# Patient Record
Sex: FEMALE | Race: WHITE | NOT HISPANIC OR LATINO | Employment: STUDENT | URBAN - METROPOLITAN AREA
[De-identification: names, ages, dates, MRNs, and addresses within clinical notes are randomized per-mention and may not be internally consistent; named-entity substitution may affect disease eponyms.]

---
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
## 2022-01-09 ENCOUNTER — HEALTH MAINTENANCE LETTER (OUTPATIENT)
Age: 22
End: 2022-01-09

## 2022-06-21 ENCOUNTER — OFFICE VISIT (OUTPATIENT)
Dept: OBGYN | Facility: CLINIC | Age: 22
End: 2022-06-21
Payer: COMMERCIAL

## 2022-06-21 VITALS
SYSTOLIC BLOOD PRESSURE: 107 MMHG | WEIGHT: 161.6 LBS | DIASTOLIC BLOOD PRESSURE: 74 MMHG | BODY MASS INDEX: 24.57 KG/M2 | HEART RATE: 55 BPM

## 2022-06-21 DIAGNOSIS — Z30.09 ENCOUNTER FOR OTHER GENERAL COUNSELING AND ADVICE ON CONTRACEPTION: Primary | ICD-10-CM

## 2022-06-21 DIAGNOSIS — Z30.430 ENCOUNTER FOR INSERTION OF INTRAUTERINE CONTRACEPTIVE DEVICE: ICD-10-CM

## 2022-06-21 DIAGNOSIS — Z11.3 SCREEN FOR STD (SEXUALLY TRANSMITTED DISEASE): ICD-10-CM

## 2022-06-21 DIAGNOSIS — Z32.00 PREGNANCY EXAMINATION OR TEST, PREGNANCY UNCONFIRMED: ICD-10-CM

## 2022-06-21 LAB — HCG UR QL: NEGATIVE

## 2022-06-21 PROCEDURE — 87591 N.GONORRHOEAE DNA AMP PROB: CPT | Performed by: OBSTETRICS & GYNECOLOGY

## 2022-06-21 PROCEDURE — 87491 CHLMYD TRACH DNA AMP PROBE: CPT | Performed by: OBSTETRICS & GYNECOLOGY

## 2022-06-21 PROCEDURE — 81025 URINE PREGNANCY TEST: CPT | Performed by: OBSTETRICS & GYNECOLOGY

## 2022-06-21 PROCEDURE — 99213 OFFICE O/P EST LOW 20 MIN: CPT | Mod: 25 | Performed by: OBSTETRICS & GYNECOLOGY

## 2022-06-21 PROCEDURE — 58300 INSERT INTRAUTERINE DEVICE: CPT | Performed by: OBSTETRICS & GYNECOLOGY

## 2022-06-21 NOTE — PROGRESS NOTES
SUBJECTIVE:       HPI: Sharon Landa is a 22 year old  who presents today for counseling and initiation of contraception.     Sharon is interested in an IUD most likely.    Previously use methods:  - OCPs. Unwanted BTB and mood concerns. Still taking. Taking pills daily, not taking late.    Ob Hx:        Gyn Hx: Patient's last menstrual period was 2022.    Patient is sexually active. One male partner   Last pap was NA<22yo   STI history - denies  STD testing offered?  Accepted  Menarche 14-15 years old. Menses every 28 days. heavy flow.  Family history of gyn-related malignancies: denies         reports that she has never smoked. She has never used smokeless tobacco.      Today's PHQ-2 Score: No flowsheet data found.  Today's PHQ-9 Score: No flowsheet data found.  Today's BLAS-7 Score: No flowsheet data found.    Problem list and histories reviewed & adjusted, as indicated.  Additional history: as documented.    Patient Active Problem List   Diagnosis     Hip pain     Past Surgical History:   Procedure Laterality Date     NO HISTORY OF SURGERY        Social History     Tobacco Use     Smoking status: Never Smoker     Smokeless tobacco: Never Used   Substance Use Topics     Alcohol use: No      *Family history is unknown by patient.            desogestrel-ethinyl estradiol (APRI) 0.15-30 MG-MCG tablet, Take 1 tablet by mouth daily  levonorgestrel (MIRENA) 20 MCG/DAY IUD, 1 each (20 mcg) by Intrauterine route once    No current facility-administered medications on file prior to visit.    No Known Allergies    ROS:  10 Point review of systems negative other noted above in HPI    OBJECTIVE:     /74   Pulse 55   Wt 73.3 kg (161 lb 9.6 oz)   LMP 2022   BMI 24.57 kg/m    Body mass index is 24.57 kg/m .      Gen: Alert, oriented, appropriately interactive, NAD  Chest: Symmetrical, unlabored breathing  Abdomen: soft, non tender, non distended, no masses, no hernias. No inguinal  lymphadenopathy.   External genitalia: no lesions; normal appearing external genitalia, bartholins glands, urethra, skenes glands  Vagina: no masses or lesions or discharge, normally rugated.  Cervix: no masses or lesions or discharge   MSK: normal gait, symmetric movements UE & LE  Lower extremities: non-tender, no edema      In-Clinic Test Results:  Results for orders placed or performed in visit on 22 (from the past 24 hour(s))   HCG Qual, Urine ([id number])   Result Value Ref Range    hCG Urine Qualitative Negative Negative       ASSESSMENT/PLAN:                                                      Sharon Landa is a 22 year old  who presents today counseling and initiation of contraception      ICD-10-CM    1. Encounter for other general counseling and advice on contraception  Z30.09    2. Pregnancy examination or test, pregnancy unconfirmed  Z32.00 HCG Qual, Urine ([id number])   3. Encounter for insertion of intrauterine contraceptive device  Z30.430 levonorgestrel (MIRENA) 20 MCG/DAY IUD     levonorgestrel (MIRENA) 20 MCG/DAY IUD 20 mcg     INSERTION INTRAUTERINE DEVICE   4. Screen for STD (sexually transmitted disease)  Z11.3 NEISSERIA GONORRHOEA PCR     CHLAMYDIA TRACHOMATIS PCR       We discussed all forms of birth control available, including LARC therapy (IUD, Nexplanon), Depo Provera, OCPs, NuvaRing or the patch, condoms and permanent sterilization in form of tubal ligation or vasectomy. After discussing risks and benefits of options, she decided that she would like Mirena IUD    Mirena IUD placed today without complication. Please see procedure note below for more information.      Azalea Bazan [facility]      IUD Insertion:  CONSULT:    Is a pregnancy test required: Yes.  Was it positive or negative?  Negative  Was a consent obtained?  Yes    Subjective: Sharon Landa is a 22 year old  presents for IUD and desires Mirena type IUD.    Patient has  been given the opportunity to ask questions about all forms of birth control, including all options appropriate for Sharon Landa. Discussed that no method of birth control, except abstinence is 100% effective against pregnancy or sexually transmitted infection.     Sharon Landa understands she may have the IUD removed at any time. IUD should be removed by a health care provider.    The entire insertion procedure was reviewed with the patient, including care after placement.    Patient's last menstrual period was 06/17/2022. Has current contraception. No allergy to betadine or shellfish. Patient desires STD screening  hCG Urine Qualitative   Date Value Ref Range Status   06/21/2022 Negative Negative Final     Comment:     This test is for screening purposes.  Results should be interpreted along with the clinical picture.  Confirmation testing is available if warranted by ordering [id number], HCG Quantitative Pregnancy.         /74   Pulse 55   Wt 73.3 kg (161 lb 9.6 oz)   LMP 06/17/2022   BMI 24.57 kg/m      Pelvic Exam:   EG/BUS: normal genital architecture without lesions, erythema or abnormal secretions.   Vagina: moist, pink, rugae with physiologic discharge and secretions  Cervix: Nulliparous no lesions and pink, moist, closed, without lesion or CMT  Uterus: mid-position, mobile, no pain  Adnexa: within normal limits and no masses, nodularity, tenderness    PROCEDURE NOTE: -- IUD Insertion    Reason for Insertion: contraception and abnormal uterine bleeding      Under sterile technique, cervix was visualized with speculum and prepped with Betadine solution swab x 3. Tenaculum was placed for stability. The uterus was gently straightened and sounded to 7.0 cm. IUD prepared for placement, and IUD inserted according to 's instructions without difficulty or significant resitance, and deployed at the fundus. The strings were visualized and trimmed to 2.5 cm from the external os. Tenaculum  was removed and hemostasis noted. Speculum removed.  Patient tolerated procedure well.    Lot # [id number]  Exp: 3/24/22    EBL: minimal    Complications: none    ASSESSMENT:     ICD-10-CM    1. Encounter for other general counseling and advice on contraception  Z30.09    2. Pregnancy examination or test, pregnancy unconfirmed  Z32.00 HCG Qual, Urine ([id number])   3. Encounter for insertion of intrauterine contraceptive device  Z30.430 levonorgestrel (MIRENA) 20 MCG/DAY IUD     levonorgestrel (MIRENA) 20 MCG/DAY IUD 20 mcg     INSERTION INTRAUTERINE DEVICE   4. Screen for STD (sexually transmitted disease)  Z11.3 NEISSERIA GONORRHOEA PCR     CHLAMYDIA TRACHOMATIS PCR        PLAN:    Given 's handouts, including when to have IUD removed, list of danger s/sx, side effects and follow up recommended. Encouraged condom use for prevention of STD. Back up contraception advised for 7 days if progestin method. Advised to call for any fever, for prolonged or severe pain or bleeding, abnormal vaginal discharge, or unable to palpate strings. She was advised to use pain medications (ibuprofen) as needed for mild to moderate pain. Advised to follow-up in clinic in 4-6 weeks for IUD string check if unable to find strings or as directed by provider.     Azalea Bazan, DO

## 2022-06-21 NOTE — PATIENT INSTRUCTIONS
If you have any questions regarding your visit, Please contact your care team.    [facility]: [phone]      Women s Health CLINIC HOURS TELEPHONE NUMBER   Azalea Bazan DO.    JESSICA Retana -Surgery Scheduler  Kristine - Surgery Scheduler    FANNY Mckee, RN  FANNY Mahoney     Monday, Thursday  [place]  7am-3pm    Tuesday, Wednesday  [place]  7am-3pm    E/O Friday &   [place]    Typical Surgery Days: Thursday or Friday   [facility]  [address] [place] [address] [phone] Phone  [phone] Fax    [address] [address] [phone] Phone    Imaging [phone] Phone    [facility] Labor and Delivery:  [phone] Phone     **Surgeries** Our Surgery Schedulers will contact you to schedule. If you do not receive a call within 3 business days, please call [phone].    Urgent Care locations:  [facility] Saturday and    9 am - 5 pm    Monday-Friday   12 pm - 8 pm  Saturday and    9 am - 5 pm   [phone]       If you need a medication refill, please contact your pharmacy. Please allow 3 business days for your refill to be completed.  As always, Thank you for trusting us with your healthcare needs!

## 2022-06-22 LAB
C TRACH DNA SPEC QL NAA+PROBE: NEGATIVE
N GONORRHOEA DNA SPEC QL NAA+PROBE: NEGATIVE

## 2022-07-29 ENCOUNTER — NURSE TRIAGE (OUTPATIENT)
Dept: NURSING | Facility: CLINIC | Age: 22
End: 2022-07-29

## 2022-07-29 ENCOUNTER — VIRTUAL VISIT (OUTPATIENT)
Dept: FAMILY MEDICINE | Facility: CLINIC | Age: 22
End: 2022-07-29
Payer: COMMERCIAL

## 2022-07-29 DIAGNOSIS — J01.00 ACUTE NON-RECURRENT MAXILLARY SINUSITIS: Primary | ICD-10-CM

## 2022-07-29 PROCEDURE — 99203 OFFICE O/P NEW LOW 30 MIN: CPT | Mod: 95 | Performed by: FAMILY MEDICINE

## 2022-07-29 ASSESSMENT — PAIN SCALES - GENERAL: PAINLEVEL: NO PAIN (0)

## 2022-07-29 NOTE — TELEPHONE ENCOUNTER
Patient is not with caller  Triager advised mother to call back when patient is with her on on the phone

## 2022-07-29 NOTE — PROGRESS NOTES
Sharon is a 22 year old who is being evaluated via a billable video visit.      How would you like to obtain your AVS? MyChart  If the video visit is dropped, the invitation should be resent by: Send to e-mail at: [email or web address]  Will anyone else be joining your video visit? No          Assessment & Plan     Acute non-recurrent maxillary sinusitis  Ongoing for two weeks. Discussed conservative cares. Treat with Augmentin for 7 days. If not improving advised in person visit. All questions answered.   - amoxicillin-clavulanate (AUGMENTIN) 875-125 MG tablet  Dispense: 14 tablet; Refill: 0    Follow up if not improving.     The risks, benefits and treatment options of prescribed medications or other treatments have been discussed with the patient. The patient verbalized their understanding and should call or follow up if no improvement or if they develop further problems.      Brodie Ashley DO  [facility]    Loren Herrera is a 22 year old, presenting for the following health issues:  URI      HPI     Acute Illness  Acute illness concerns: productive cough, home test for COVID 30 min. Ago was negative   Onset/Duration: started 2 weeks ago  Symptoms:  Fever: No  Chills/Sweats: No  Headache (location?): YES  Sinus Pressure: YES  Conjunctivitis:  No  Ear Pain: no  Rhinorrhea: YES  Congestion: YES  Sore Throat: YES  Cough: YES  Wheeze: YES  Decreased Appetite: No  Nausea: No  Vomiting: No  Diarrhea: No  Dysuria/Freq.: No  Dysuria or Hematuria: No  Fatigue/Achiness: YES  Sick/Strep Exposure: No  Therapies tried and outcome: Advil      Reports nasal congestion and pain.   Negative COVID test  No allergies.     Review of Systems   Constitutional, HEENT, cardiovascular, pulmonary, gi and gu systems are negative, except as otherwise noted.      Objective           Vitals:  No vitals were obtained today due to virtual visit.    Physical Exam   GENERAL: Healthy, alert and no distress  EYES: Eyes  grossly normal to inspection.  No discharge or erythema, or obvious scleral/conjunctival abnormalities.  RESP: No audible wheeze, cough, or visible cyanosis.  No visible retractions or increased work of breathing.    SKIN: Visible skin clear. No significant rash, abnormal pigmentation or lesions.  NEURO: Cranial nerves grossly intact.  Mentation and speech appropriate for age.  PSYCH: Mentation appears normal, affect normal/bright, judgement and insight intact, normal speech and appearance well-groomed.        Video-Visit Details    Video Start Time: 3:45    Type of service:  Video Visit    Video End Time:3:54 PM    Originating Location (pt. Location): Home    Distant Location (provider location):  [facility]     Platform used for Video Visit: Carma    .  ..

## 2022-11-15 DIAGNOSIS — J32.4 CHRONIC PANSINUSITIS: Primary | ICD-10-CM

## 2022-11-29 ENCOUNTER — LAB (OUTPATIENT)
Dept: LAB | Facility: CLINIC | Age: 22
End: 2022-11-29
Payer: COMMERCIAL

## 2022-11-29 DIAGNOSIS — Z11.59 NEED FOR HEPATITIS C SCREENING TEST: ICD-10-CM

## 2022-11-29 DIAGNOSIS — J32.4 CHRONIC PANSINUSITIS: ICD-10-CM

## 2022-11-29 DIAGNOSIS — Z11.3 SCREEN FOR STD (SEXUALLY TRANSMITTED DISEASE): ICD-10-CM

## 2022-11-29 DIAGNOSIS — Z11.4 SCREENING FOR HIV (HUMAN IMMUNODEFICIENCY VIRUS): ICD-10-CM

## 2022-11-29 PROCEDURE — 82787 IGG 1 2 3 OR 4 EACH: CPT

## 2022-11-29 PROCEDURE — 36415 COLL VENOUS BLD VENIPUNCTURE: CPT

## 2022-11-29 PROCEDURE — 87389 HIV-1 AG W/HIV-1&-2 AB AG IA: CPT

## 2022-11-29 PROCEDURE — 86803 HEPATITIS C AB TEST: CPT

## 2022-11-29 PROCEDURE — 82784 ASSAY IGA/IGD/IGG/IGM EACH: CPT | Mod: 59

## 2022-11-29 PROCEDURE — 82784 ASSAY IGA/IGD/IGG/IGM EACH: CPT

## 2022-11-30 LAB
HCV AB SERPL QL IA: NONREACTIVE
HIV 1+2 AB+HIV1 P24 AG SERPL QL IA: NONREACTIVE

## 2022-12-02 LAB
IGA SERPL-MCNC: 69 MG/DL (ref 84–499)
IGG SERPL-MCNC: 785 MG/DL (ref 610–1616)
IGG SERPL-MCNC: 785 MG/DL (ref 610–1616)
IGG1 SER-MCNC: 450 MG/DL (ref 382–929)
IGG2 SER-MCNC: 161 MG/DL (ref 242–700)
IGG3 SER-MCNC: 26 MG/DL (ref 22–176)
IGG4 SER-MCNC: 24 MG/DL (ref 4–86)
IGM SERPL-MCNC: 67 MG/DL (ref 35–242)
SUBCLASSES, PERCENT: 84 %

## 2023-04-16 ENCOUNTER — HEALTH MAINTENANCE LETTER (OUTPATIENT)
Age: 23
End: 2023-04-16

## 2023-06-28 ENCOUNTER — TELEPHONE (OUTPATIENT)
Dept: FAMILY MEDICINE | Facility: CLINIC | Age: 23
End: 2023-06-28
Payer: COMMERCIAL

## 2023-06-28 NOTE — TELEPHONE ENCOUNTER
Patient Quality Outreach    Patient is due for the following:   Cervical Cancer Screening - PAP Needed    Next Steps:   Schedule a Adult Preventative    Type of outreach:    Sent letter.      Questions for provider review:    None           Mary Costello, [facility]

## 2023-06-28 NOTE — LETTER
June 28, 2023    To  Sharon [place]  [address] [address] [place] [address]    Your team at [facility] cares about your health. We have reviewed your chart and based on our findings; we are making the following recommendations to better manage your health.     You are in particular need of attention regarding the following:     Schedule a primary care office visit with your provider for a Pap Smear to screen for Cervical Cancer.    If you have already completed these items, please contact the clinic via phone or   kooabahart so your care team can review and update your records. Thank you for   choosing [facility] for your healthcare needs. For any questions,   concerns, or to schedule an appointment please contact our clinic.    Healthy Regards,      Your [facility] Care Team

## 2023-10-12 ENCOUNTER — OFFICE VISIT (OUTPATIENT)
Dept: OBGYN | Facility: CLINIC | Age: 23
End: 2023-10-12
Payer: COMMERCIAL

## 2023-10-12 VITALS
SYSTOLIC BLOOD PRESSURE: 116 MMHG | TEMPERATURE: 98.1 F | BODY MASS INDEX: 24.96 KG/M2 | DIASTOLIC BLOOD PRESSURE: 66 MMHG | HEIGHT: 69 IN | WEIGHT: 168.5 LBS | HEART RATE: 80 BPM

## 2023-10-12 DIAGNOSIS — N89.8 VAGINAL ITCHING: ICD-10-CM

## 2023-10-12 DIAGNOSIS — Z23 NEED FOR PROPHYLACTIC VACCINATION AND INOCULATION AGAINST INFLUENZA: ICD-10-CM

## 2023-10-12 DIAGNOSIS — N76.2 ACUTE VULVITIS: Primary | ICD-10-CM

## 2023-10-12 LAB
CLUE CELLS: ABNORMAL
TRICHOMONAS, WET PREP: ABNORMAL
WBC'S/HIGH POWER FIELD, WET PREP: ABNORMAL
YEAST, WET PREP: ABNORMAL

## 2023-10-12 PROCEDURE — 87210 SMEAR WET MOUNT SALINE/INK: CPT | Performed by: OBSTETRICS & GYNECOLOGY

## 2023-10-12 PROCEDURE — 99213 OFFICE O/P EST LOW 20 MIN: CPT | Mod: 25 | Performed by: OBSTETRICS & GYNECOLOGY

## 2023-10-12 PROCEDURE — 87591 N.GONORRHOEAE DNA AMP PROB: CPT | Performed by: OBSTETRICS & GYNECOLOGY

## 2023-10-12 PROCEDURE — 87491 CHLMYD TRACH DNA AMP PROBE: CPT | Performed by: OBSTETRICS & GYNECOLOGY

## 2023-10-12 PROCEDURE — 90686 IIV4 VACC NO PRSV 0.5 ML IM: CPT | Performed by: OBSTETRICS & GYNECOLOGY

## 2023-10-12 PROCEDURE — 90471 IMMUNIZATION ADMIN: CPT | Performed by: OBSTETRICS & GYNECOLOGY

## 2023-10-12 NOTE — PROGRESS NOTES
S; Sharon Landa is a 23 year old  who present with concerns for vaginal infection. For the past week or so she has noticed a lot of vulvar itching and irritation, burning with urination.  She denies increased discharge or odor.  She has a mirena IUD, likes it, but does have some unpredictable spotting.  She is in a relatively new relationship, has some worries about STD, interested in screening today.    Past Medical History:   Diagnosis Date    No pertinent past medical history      Past Surgical History:   Procedure Laterality Date    NO HISTORY OF SURGERY       OB History    Para Term  AB Living   0 0 0 0 0 0   SAB IAB Ectopic Multiple Live Births   0 0 0 0 0      No Known Allergies      Current Outpatient Medications:     levonorgestrel (MIRENA) 20 MCG/DAY IUD, 1 each (20 mcg) by Intrauterine route once, Disp: , Rfl:     Social History     Socioeconomic History    Marital status: Single     Spouse name: Not on file    Number of children: Not on file    Years of education: Not on file    Highest education level: Not on file   Occupational History    Not on file   Tobacco Use    Smoking status: Never    Smokeless tobacco: Never   Vaping Use    Vaping Use: Former    Substances: Nicotine, Flavoring    Devices: Refillable tank   Substance and Sexual Activity    Alcohol use: Yes     Comment: occ    Drug use: No    Sexual activity: Yes     Partners: Male     Birth control/protection: I.U.D.   Other Topics Concern    Not on file   Social History Narrative    Not on file     Social Determinants of Health     Financial Resource Strain: Not on file   Food Insecurity: Not on file   Transportation Needs: Not on file   Physical Activity: Not on file   Stress: Not on file   Social Connections: Not on file   Interpersonal Safety: Not on file   Housing Stability: Not on file     Family History   Problem Relation Age of Onset    Skin Cancer Father     Skin Cancer Maternal Grandfather      Past medical,  "surgical, social and family history were reviewed and updated in EPIC.    PE: /66   Pulse 80   Temp 98.1  F (36.7  C)   Ht 1.753 m (5' 9\")   Wt 76.4 kg (168 lb 8 oz)   BMI 24.88 kg/m       Psych: normal affect, appropriate eye contact  Resp: no increased work of breathing  CV: no peripheral edema  Abd; SNT, no palpable masses  Lymph: no enlarged inquinal nodes  Pelvic: shaved vulva with erythema, no lesions.  Vagina with mild erythema, scant bloody discharge. Cervix without lesions or CMT  Skin: no visible rashes or lesions.    Wet prep: negative    A/P: Vulvar inflammation   Discussed use of hydrocortisone ointment BID prn to help relieve the vulvar inflammation. Gc/ct sent.  Questions answered.  Rtc prn    KYLE DE LA ROSA MD      "

## 2023-10-13 LAB
C TRACH DNA SPEC QL PROBE+SIG AMP: NEGATIVE
N GONORRHOEA DNA SPEC QL NAA+PROBE: NEGATIVE

## 2024-01-16 ENCOUNTER — TELEPHONE (OUTPATIENT)
Dept: FAMILY MEDICINE | Facility: CLINIC | Age: 24
End: 2024-01-16
Payer: COMMERCIAL

## 2024-01-16 NOTE — TELEPHONE ENCOUNTER
Patient Quality Outreach    Patient is due for the following:   Cervical Cancer Screening - PAP Needed    Next Steps:   Schedule a Adult Preventative    Type of outreach:    Sent letter.      Questions for provider review:    None           Mary Costello, [facility]

## 2024-01-16 NOTE — LETTER
January 16, 2024    To  Sharon [place]  [id number] [address] [place] [address]    Your team at [facility] cares about your health. We have reviewed your chart and based on our findings; we are making the following recommendations to better manage your health.     You are in particular need of attention regarding the following:     Schedule a primary care office visit with your provider for a Pap Smear to screen for Cervical Cancer.    If you have already completed these items, please contact the clinic via phone or   [facility] so your care team can review and update your records. Thank you for   choosing [facility] for your healthcare needs. For any questions,   concerns, or to schedule an appointment please contact our clinic.    Healthy Regards,      Your [facility] Care Team

## 2024-04-01 ENCOUNTER — TELEPHONE (OUTPATIENT)
Dept: FAMILY MEDICINE | Facility: CLINIC | Age: 24
End: 2024-04-01
Payer: COMMERCIAL

## 2024-04-01 NOTE — TELEPHONE ENCOUNTER
Patient Quality Outreach    Patient is due for the following:   Cervical Cancer Screening - PAP Needed    Next Steps:   Schedule a Adult Preventative    Type of outreach:    Sent letter.      Questions for provider review:    None           Mary Costello, [facility]

## 2024-04-01 NOTE — LETTER
April 1, 2024    To  Sharon Landa  [id number] [facility] [place] [address]    Your team at [facility] cares about your health. We have reviewed your chart and based on our findings; we are making the following recommendations to better manage your health.     You are in particular need of attention regarding the following:     Schedule a primary care office visit with your provider for a Pap Smear to screen for Cervical Cancer.    If you have already completed these items, please contact the clinic via phone or   [facility] so your care team can review and update your records. Thank you for   choosing [facility] for your healthcare needs. For any questions,   concerns, or to schedule an appointment please contact our clinic.    Healthy Regards,      Your [facility] Care Team

## 2024-05-22 ENCOUNTER — OFFICE VISIT (OUTPATIENT)
Dept: FAMILY MEDICINE | Facility: CLINIC | Age: 24
End: 2024-05-22
Payer: COMMERCIAL

## 2024-05-22 VITALS
SYSTOLIC BLOOD PRESSURE: 110 MMHG | HEART RATE: 83 BPM | BODY MASS INDEX: 24.71 KG/M2 | DIASTOLIC BLOOD PRESSURE: 78 MMHG | TEMPERATURE: 97.5 F | OXYGEN SATURATION: 98 % | HEIGHT: 68 IN | WEIGHT: 163 LBS | RESPIRATION RATE: 16 BRPM

## 2024-05-22 DIAGNOSIS — Z12.4 CERVICAL CANCER SCREENING: ICD-10-CM

## 2024-05-22 DIAGNOSIS — Z00.00 ENCOUNTER FOR ROUTINE ADULT HEALTH EXAMINATION WITHOUT ABNORMAL FINDINGS: Primary | ICD-10-CM

## 2024-05-22 PROCEDURE — 99395 PREV VISIT EST AGE 18-39: CPT | Mod: 25 | Performed by: FAMILY MEDICINE

## 2024-05-22 PROCEDURE — 90715 TDAP VACCINE 7 YRS/> IM: CPT | Performed by: FAMILY MEDICINE

## 2024-05-22 PROCEDURE — 90471 IMMUNIZATION ADMIN: CPT | Performed by: FAMILY MEDICINE

## 2024-05-22 PROCEDURE — G0145 SCR C/V CYTO,THINLAYER,RESCR: HCPCS | Performed by: FAMILY MEDICINE

## 2024-05-22 SDOH — HEALTH STABILITY: PHYSICAL HEALTH: ON AVERAGE, HOW MANY DAYS PER WEEK DO YOU ENGAGE IN MODERATE TO STRENUOUS EXERCISE (LIKE A BRISK WALK)?: 4 DAYS

## 2024-05-22 ASSESSMENT — PAIN SCALES - GENERAL: PAINLEVEL: NO PAIN (0)

## 2024-05-22 ASSESSMENT — SOCIAL DETERMINANTS OF HEALTH (SDOH): HOW OFTEN DO YOU GET TOGETHER WITH FRIENDS OR RELATIVES?: THREE TIMES A WEEK

## 2024-05-22 NOTE — PROGRESS NOTES
Preventive Care Visit  [facility]  Sharee Eden MD, Family Medicine  May 22, 2024      Assessment & Plan     (Z00.00) Encounter for routine adult health examination without abnormal findings  (primary encounter diagnosis)  Comment: 24 yr old female here for her annual physical. Doing well overall. Recommend exercise and healthy lifestyle.    (Z12.4) Cervical cancer screening  Comment: Patient will be notified of results.   Plan: Pap Screen Only - Recommended Age 21 - 24 Years      Patient has been advised of split billing requirements and indicates understanding: Yes        Counseling  Appropriate preventive services were discussed with this patient, including applicable screening as appropriate for fall prevention, nutrition, physical activity, Tobacco-use cessation, weight loss and cognition.  Checklist reviewing preventive services available has been given to the patient.  Reviewed patient's diet, addressing concerns and/or questions.       FUTURE APPOINTMENTS:       - Follow-up visit as needed.    Loren Herrera is a 24 year old, presenting for the followin yr old female here for her annual physical and pap smear. Patient reports that she does not take any routine medication.   She is relatively healthy. Denies any acute symptoms. She has had no significant change in her health since her last visit.   She denies any family history of heart disease or high cholesterol.   Physical (Pap and physical.), Blood Draw (She is not fasting today.), and Imm/Inj (Due for her TDAP and 3rd HPV.  Declined Covid injection today.)        2024    10:59 AM   Additional Questions   Roomed by Lisseth Wagoner CMA        Health Care Directive  Patient does not have a Health Care Directive or Living Will: Discussed advance care planning with patient; however, patient declined at this time.    HPI  Chief Complaint   Patient presents with    Physical     Pap and physical.     Blood Draw     She is not fasting today.    Imm/Inj     Due for her TDAP and 3rd HPV.  Declined Covid injection today.             5/22/2024   General Health   How would you rate your overall physical health? Excellent   Feel stress (tense, anxious, or unable to sleep) Very much   (!) STRESS CONCERN      5/22/2024   Nutrition   Three or more servings of calcium each day? Yes   Diet: Regular (no restrictions)   How many servings of fruit and vegetables per day? (!) 2-3   How many sweetened beverages each day? 0-1         5/22/2024   Exercise   Days per week of moderate/strenous exercise 4 days         5/22/2024   Social Factors   Frequency of gathering with friends or relatives Three times a week   Worry food won't last until get money to buy more No   Food not last or not have enough money for food? No   Do you have housing?  Yes   Are you worried about losing your housing? No   Lack of transportation? No   Unable to get utilities (heat,electricity)? No         5/22/2024   Dental   Dentist two times every year? Yes         5/22/2024   TB Screening   Were you born outside of the [place]? No         Today's PHQ-2 Score:       5/22/2024    10:56 AM   PHQ-2 ( 1999 [employer])   Q1: Little interest or pleasure in doing things 1   Q2: Feeling down, depressed or hopeless 1   PHQ-2 Score 2   Q1: Little interest or pleasure in doing things Several days   Q2: Feeling down, depressed or hopeless Several days   PHQ-2 Score 2           5/22/2024   Substance Use   Alcohol more than 3/day or more than 7/wk No   Do you use any other substances recreationally? No     Social History     Tobacco Use    Smoking status: Never    Smokeless tobacco: Never   Vaping Use    Vaping status: Former    Substances: Nicotine, Flavoring    Devices: Refillable tank   Substance Use Topics    Alcohol use: Yes     Comment: occ    Drug use: No         5/22/2024   STI Screening   New sexual partner(s) since last STI/HIV test? (!) YES      History of abnormal Pap  smear: No - age 21-29 PAP every 3 years recommended             5/22/2024   Contraception/Family Planning   Questions about contraception or family planning No        Reviewed and updated as needed this visit by Provider                    Past Medical History:   Diagnosis Date    No pertinent past medical history      Past Surgical History:   Procedure Laterality Date    NO HISTORY OF SURGERY       Lab work is in process  Labs reviewed in EPIC  BP Readings from Last 3 Encounters:   05/22/24 110/78   10/12/23 116/66   06/21/22 107/74    Wt Readings from Last 3 Encounters:   05/22/24 73.9 kg (163 lb)   10/12/23 76.4 kg (168 lb 8 oz)   06/21/22 73.3 kg (161 lb 9.6 oz)                  Patient Active Problem List   Diagnosis    Hip pain     Past Surgical History:   Procedure Laterality Date    NO HISTORY OF SURGERY         Social History     Tobacco Use    Smoking status: Never    Smokeless tobacco: Never   Substance Use Topics    Alcohol use: Yes     Comment: occ     Family History   Problem Relation Age of Onset    Skin Cancer Father     Skin Cancer Maternal Grandfather          Current Outpatient Medications   Medication Sig Dispense Refill    levonorgestrel (MIRENA) 20 MCG/DAY IUD 1 each (20 mcg) by Intrauterine route once       No Known Allergies      Review of Systems  CONSTITUTIONAL: NEGATIVE for fever, chills, change in weight  INTEGUMENTARY/SKIN: NEGATIVE for worrisome rashes, moles or lesions  EYES: NEGATIVE for vision changes or irritation  ENT/MOUTH: NEGATIVE for ear, mouth and throat problems  RESP: NEGATIVE for significant cough or SOB  BREAST: NEGATIVE for masses, tenderness or discharge  CV: NEGATIVE for chest pain, palpitations or peripheral edema  GI: NEGATIVE for nausea, abdominal pain, heartburn, or change in bowel habits  : NEGATIVE for frequency, dysuria, or hematuria  MUSCULOSKELETAL: NEGATIVE for significant arthralgias or myalgia  NEURO: NEGATIVE for weakness, dizziness or  "paresthesias  ENDOCRINE: NEGATIVE for temperature intolerance, skin/hair changes  HEME: NEGATIVE for bleeding problems  PSYCHIATRIC: NEGATIVE for changes in mood or affect     Objective    Exam  /78 (BP Location: Right arm, Patient Position: Chair, Cuff Size: Adult Regular)   Pulse 83   Temp 97.5  F (36.4  C) (Tympanic)   Resp 16   Ht 1.727 m (5' 8\")   Wt 73.9 kg (163 lb)   LMP 05/16/2024 (Approximate)   SpO2 98%   BMI 24.78 kg/m     Estimated body mass index is 24.78 kg/m  as calculated from the following:    Height as of this encounter: 1.727 m (5' 8\").    Weight as of this encounter: 73.9 kg (163 lb).    Physical Exam  GENERAL: alert and no distress  EYES: Eyes grossly normal to inspection, PERRL and conjunctivae and sclerae normal  HENT: ear canals and TM's normal, nose and mouth without ulcers or lesions  NECK: no adenopathy, no asymmetry, masses, or scars  RESP: lungs clear to auscultation - no rales, rhonchi or wheezes  CV: regular rate and rhythm, normal S1 S2, no S3 or S4, no murmur, click or rub, no peripheral edema  ABDOMEN: soft, nontender, no hepatosplenomegaly, no masses and bowel sounds normal   (female): normal female external genitalia, normal urethral meatus, normal vaginal mucosa, pap smear obtained.  MS: no gross musculoskeletal defects noted, no edema  SKIN: no suspicious lesions or rashes  NEURO: Normal strength and tone, mentation intact and speech normal  PSYCH: mentation appears normal, affect normal/bright        Signed Electronically by: Sharee Eden MD    "

## 2024-05-28 LAB
BKR LAB AP GYN ADEQUACY: NORMAL
BKR LAB AP GYN INTERPRETATION: NORMAL
BKR LAB AP HPV REFLEX: NO
BKR LAB AP LMP: NORMAL
BKR LAB AP PREVIOUS ABNORMAL: NORMAL
PATH REPORT.COMMENTS IMP SPEC: NORMAL
PATH REPORT.COMMENTS IMP SPEC: NORMAL
PATH REPORT.RELEVANT HX SPEC: NORMAL